# Patient Record
Sex: FEMALE | Race: WHITE | NOT HISPANIC OR LATINO | URBAN - METROPOLITAN AREA
[De-identification: names, ages, dates, MRNs, and addresses within clinical notes are randomized per-mention and may not be internally consistent; named-entity substitution may affect disease eponyms.]

---

## 2017-02-10 ENCOUNTER — IMPORTED ENCOUNTER (OUTPATIENT)
Dept: URBAN - METROPOLITAN AREA CLINIC 9 | Facility: CLINIC | Age: 57
End: 2017-02-10

## 2017-05-19 ENCOUNTER — IMPORTED ENCOUNTER (OUTPATIENT)
Dept: URBAN - METROPOLITAN AREA CLINIC 9 | Facility: CLINIC | Age: 57
End: 2017-05-19

## 2017-09-21 ENCOUNTER — IMPORTED ENCOUNTER (OUTPATIENT)
Dept: URBAN - METROPOLITAN AREA CLINIC 9 | Facility: CLINIC | Age: 57
End: 2017-09-21

## 2017-11-06 ENCOUNTER — IMPORTED ENCOUNTER (OUTPATIENT)
Dept: URBAN - METROPOLITAN AREA CLINIC 9 | Facility: CLINIC | Age: 57
End: 2017-11-06

## 2018-01-18 ENCOUNTER — IMPORTED ENCOUNTER (OUTPATIENT)
Dept: URBAN - METROPOLITAN AREA CLINIC 9 | Facility: CLINIC | Age: 58
End: 2018-01-18

## 2018-05-07 ENCOUNTER — IMPORTED ENCOUNTER (OUTPATIENT)
Dept: URBAN - METROPOLITAN AREA CLINIC 9 | Facility: CLINIC | Age: 58
End: 2018-05-07

## 2018-05-21 ENCOUNTER — IMPORTED ENCOUNTER (OUTPATIENT)
Dept: URBAN - METROPOLITAN AREA CLINIC 9 | Facility: CLINIC | Age: 58
End: 2018-05-21

## 2018-10-01 ENCOUNTER — IMPORTED ENCOUNTER (OUTPATIENT)
Dept: URBAN - METROPOLITAN AREA CLINIC 9 | Facility: CLINIC | Age: 58
End: 2018-10-01

## 2019-02-01 ENCOUNTER — IMPORTED ENCOUNTER (OUTPATIENT)
Dept: URBAN - METROPOLITAN AREA CLINIC 9 | Facility: CLINIC | Age: 59
End: 2019-02-01

## 2019-05-09 ENCOUNTER — IMPORTED ENCOUNTER (OUTPATIENT)
Dept: URBAN - METROPOLITAN AREA CLINIC 9 | Facility: CLINIC | Age: 59
End: 2019-05-09

## 2019-07-08 ENCOUNTER — IMPORTED ENCOUNTER (OUTPATIENT)
Dept: URBAN - METROPOLITAN AREA CLINIC 9 | Facility: CLINIC | Age: 59
End: 2019-07-08

## 2019-07-19 ENCOUNTER — IMPORTED ENCOUNTER (OUTPATIENT)
Dept: URBAN - METROPOLITAN AREA CLINIC 9 | Facility: CLINIC | Age: 59
End: 2019-07-19

## 2019-09-19 ENCOUNTER — IMPORTED ENCOUNTER (OUTPATIENT)
Dept: URBAN - METROPOLITAN AREA CLINIC 9 | Facility: CLINIC | Age: 59
End: 2019-09-19

## 2019-09-23 ENCOUNTER — IMPORTED ENCOUNTER (OUTPATIENT)
Dept: URBAN - METROPOLITAN AREA CLINIC 9 | Facility: CLINIC | Age: 59
End: 2019-09-23

## 2019-10-02 ENCOUNTER — IMPORTED ENCOUNTER (OUTPATIENT)
Dept: URBAN - METROPOLITAN AREA CLINIC 9 | Facility: CLINIC | Age: 59
End: 2019-10-02

## 2019-10-03 ENCOUNTER — IMPORTED ENCOUNTER (OUTPATIENT)
Dept: URBAN - METROPOLITAN AREA CLINIC 9 | Facility: CLINIC | Age: 59
End: 2019-10-03

## 2019-11-01 ENCOUNTER — IMPORTED ENCOUNTER (OUTPATIENT)
Dept: URBAN - METROPOLITAN AREA CLINIC 9 | Facility: CLINIC | Age: 59
End: 2019-11-01

## 2019-11-22 ENCOUNTER — IMPORTED ENCOUNTER (OUTPATIENT)
Dept: URBAN - METROPOLITAN AREA CLINIC 9 | Facility: CLINIC | Age: 59
End: 2019-11-22

## 2020-02-21 ENCOUNTER — IMPORTED ENCOUNTER (OUTPATIENT)
Dept: URBAN - METROPOLITAN AREA CLINIC 9 | Facility: CLINIC | Age: 60
End: 2020-02-21

## 2020-06-29 ENCOUNTER — IMPORTED ENCOUNTER (OUTPATIENT)
Dept: URBAN - METROPOLITAN AREA CLINIC 9 | Facility: CLINIC | Age: 60
End: 2020-06-29

## 2020-07-15 ENCOUNTER — IMPORTED ENCOUNTER (OUTPATIENT)
Dept: URBAN - METROPOLITAN AREA CLINIC 9 | Facility: CLINIC | Age: 60
End: 2020-07-15

## 2020-08-03 ENCOUNTER — IMPORTED ENCOUNTER (OUTPATIENT)
Dept: URBAN - METROPOLITAN AREA CLINIC 9 | Facility: CLINIC | Age: 60
End: 2020-08-03

## 2020-10-21 ENCOUNTER — IMPORTED ENCOUNTER (OUTPATIENT)
Dept: URBAN - METROPOLITAN AREA CLINIC 9 | Facility: CLINIC | Age: 60
End: 2020-10-21

## 2021-02-11 ENCOUNTER — IMPORTED ENCOUNTER (OUTPATIENT)
Dept: URBAN - METROPOLITAN AREA CLINIC 9 | Facility: CLINIC | Age: 61
End: 2021-02-11

## 2021-05-21 ENCOUNTER — IMPORTED ENCOUNTER (OUTPATIENT)
Dept: URBAN - METROPOLITAN AREA CLINIC 9 | Facility: CLINIC | Age: 61
End: 2021-05-21

## 2021-07-26 ENCOUNTER — IMPORTED ENCOUNTER (OUTPATIENT)
Dept: URBAN - METROPOLITAN AREA CLINIC 9 | Facility: CLINIC | Age: 61
End: 2021-07-26

## 2021-09-03 ENCOUNTER — IMPORTED ENCOUNTER (OUTPATIENT)
Dept: URBAN - METROPOLITAN AREA CLINIC 9 | Facility: CLINIC | Age: 61
End: 2021-09-03

## 2021-09-08 ENCOUNTER — IMPORTED ENCOUNTER (OUTPATIENT)
Dept: URBAN - METROPOLITAN AREA CLINIC 9 | Facility: CLINIC | Age: 61
End: 2021-09-08

## 2021-09-08 PROBLEM — H25.13: Noted: 2021-09-08

## 2021-09-08 PROBLEM — H40.1132: Noted: 2021-09-08

## 2021-10-19 ASSESSMENT — TONOMETRY
OD_IOP_MMHG: 9
OD_IOP_MMHG: 10
OD_IOP_MMHG: 15
OS_IOP_MMHG: 8
OD_IOP_MMHG: 8
OD_IOP_MMHG: 11
OD_IOP_MMHG: 12
OS_IOP_MMHG: 12
OD_IOP_MMHG: 9
OD_IOP_MMHG: 12
OD_IOP_MMHG: 11
OS_IOP_MMHG: 10
OD_IOP_MMHG: 10
OS_IOP_MMHG: 13
OD_IOP_MMHG: 12
OD_IOP_MMHG: 9
OD_IOP_MMHG: 11
OS_IOP_MMHG: 9
OD_IOP_MMHG: 10
OS_IOP_MMHG: 14
OS_IOP_MMHG: 10
OS_IOP_MMHG: 9
OS_IOP_MMHG: 11
OD_IOP_MMHG: 14
OD_IOP_MMHG: 11
OD_IOP_MMHG: 9
OS_IOP_MMHG: 12
OS_IOP_MMHG: 13
OS_IOP_MMHG: 11
OS_IOP_MMHG: 11
OS_IOP_MMHG: 9
OD_IOP_MMHG: 13
OD_IOP_MMHG: 12
OS_IOP_MMHG: 12
OS_IOP_MMHG: 7
OS_IOP_MMHG: 8
OS_IOP_MMHG: 11

## 2021-10-19 ASSESSMENT — VISUAL ACUITY
OS_CC: 20/25 SN
OS_CC: 20/25 SN
OD_CC: 20/25 -2 SN
OD_CC: 20/25 -2 SN
OD_CC: 20/25 SN
OD_CC: 20/30 + SN
OD_CC: 20/30 SN
OD_CC: 20/30 SN
OD_CC: 20/25 -2 SN
OS_CC: 20/25 SN
OD_CC: 20/25 SN
OS_CC: 20/40 SN
OD_CC: 20/30 + SN
OS_CC: 20/25 -2 SN
OS_CC: 20/25 SN
OS_CC: 20/20 - SN
OD_CC: 20/25 SN
OD_CC: 20/30 + SN
OD_CC: 20/30 - SN
OD_CC: 20/25 SN
OS_CC: 20/25 -2 SN
OD_CC: 20/20 -2 SN
OD_CC: 20/25 SN
OD_CC: 20/25 SN
OD_CC: 20/30 + SN
OS_CC: 20/20 SN
OS_CC: 20/25 +2 SN
OS_CC: 20/25 SN
OS_CC: 20/25 +2 SN
OS_CC: 20/20 +2 SN
OS_CC: 20/25 SN
OS_CC: 20/20 - SN
OS_CC: 20/25 - SN
OD_CC: 20/25 SN
OD_CC: 20/30 SN
OS_CC: 20/25 + SN
OS_CC: 20/25 SN
OS_CC: 20/25 + SN
OS_CC: 20/20 -2 SN

## 2021-12-07 ENCOUNTER — ESTABLISHED PATIENT (OUTPATIENT)
Dept: URBAN - METROPOLITAN AREA CLINIC 4 | Facility: CLINIC | Age: 61
End: 2021-12-07

## 2021-12-07 DIAGNOSIS — H40.1132: ICD-10-CM

## 2021-12-07 PROCEDURE — 99213 OFFICE O/P EST LOW 20 MIN: CPT

## 2021-12-07 ASSESSMENT — VISUAL ACUITY
OS_CC: 20/25
OD_CC: 20/20

## 2021-12-07 ASSESSMENT — TONOMETRY
OD_IOP_MMHG: 11
OS_IOP_MMHG: 12

## 2022-03-10 ENCOUNTER — ESTABLISHED PATIENT (OUTPATIENT)
Dept: URBAN - METROPOLITAN AREA CLINIC 4 | Facility: CLINIC | Age: 62
End: 2022-03-10

## 2022-03-10 DIAGNOSIS — H40.1132: ICD-10-CM

## 2022-03-10 PROCEDURE — 99213 OFFICE O/P EST LOW 20 MIN: CPT

## 2022-03-10 ASSESSMENT — VISUAL ACUITY
OD_CC: 20/30
OS_CC: 20/25+1

## 2022-03-10 ASSESSMENT — TONOMETRY
OD_IOP_MMHG: 15
OS_IOP_MMHG: 17

## 2022-06-26 RX ORDER — BETAMETHASONE DIPROPIONATE 0.5 MG/G
CREAM TOPICAL
COMMUNITY
Start: 2021-08-16

## 2022-06-26 RX ORDER — LISINOPRIL 10 MG/1
TABLET ORAL
COMMUNITY
Start: 2021-08-16

## 2022-07-06 ENCOUNTER — ESTABLISHED PATIENT (OUTPATIENT)
Dept: URBAN - METROPOLITAN AREA CLINIC 4 | Facility: CLINIC | Age: 62
End: 2022-07-06

## 2022-07-06 DIAGNOSIS — H40.1132: ICD-10-CM

## 2022-07-06 DIAGNOSIS — E11.9: ICD-10-CM

## 2022-07-06 DIAGNOSIS — Z79.899: ICD-10-CM

## 2022-07-06 DIAGNOSIS — H25.13: ICD-10-CM

## 2022-07-06 PROCEDURE — 99214 OFFICE O/P EST MOD 30 MIN: CPT

## 2022-07-06 PROCEDURE — 92250 FUNDUS PHOTOGRAPHY W/I&R: CPT

## 2022-07-06 ASSESSMENT — VISUAL ACUITY
OS_CC: 20/30
OD_CC: 20/30

## 2022-07-06 ASSESSMENT — TONOMETRY
OD_IOP_MMHG: 12
OS_IOP_MMHG: 15

## 2022-09-12 ENCOUNTER — TECH ONLY (OUTPATIENT)
Dept: URBAN - METROPOLITAN AREA CLINIC 4 | Facility: CLINIC | Age: 62
End: 2022-09-12

## 2022-09-12 DIAGNOSIS — Z79.899: ICD-10-CM

## 2022-09-12 PROCEDURE — 92083 EXTENDED VISUAL FIELD XM: CPT

## 2022-09-12 PROCEDURE — 92134 CPTRZ OPH DX IMG PST SGM RTA: CPT

## 2022-10-24 ENCOUNTER — TECH ONLY (OUTPATIENT)
Dept: URBAN - METROPOLITAN AREA CLINIC 4 | Facility: CLINIC | Age: 62
End: 2022-10-24

## 2022-10-24 DIAGNOSIS — H40.1132: ICD-10-CM

## 2022-10-24 PROCEDURE — 92083 EXTENDED VISUAL FIELD XM: CPT

## 2022-10-24 PROCEDURE — 92133 CPTRZD OPH DX IMG PST SGM ON: CPT

## 2022-12-02 ENCOUNTER — COMPREHENSIVE EXAM (OUTPATIENT)
Dept: URBAN - METROPOLITAN AREA CLINIC 4 | Facility: CLINIC | Age: 62
End: 2022-12-02

## 2022-12-02 PROCEDURE — 92250 FUNDUS PHOTOGRAPHY W/I&R: CPT

## 2022-12-02 PROCEDURE — 99214 OFFICE O/P EST MOD 30 MIN: CPT

## 2022-12-02 ASSESSMENT — TONOMETRY
OD_IOP_MMHG: 8
OS_IOP_MMHG: 8

## 2022-12-02 ASSESSMENT — VISUAL ACUITY
OS_CC: 20/20-1
OD_CC: 20/25

## 2022-12-02 ASSESSMENT — KERATOMETRY
OD_AXISANGLE2_DEGREES: 84
OD_K2POWER_DIOPTERS: 45.25
OS_K2POWER_DIOPTERS: 45.00
OS_AXISANGLE2_DEGREES: 87
OD_K1POWER_DIOPTERS: 44.00
OS_K1POWER_DIOPTERS: 44.00
OD_AXISANGLE_DEGREES: 174
OS_AXISANGLE_DEGREES: 177

## 2022-12-05 NOTE — PATIENT DISCUSSION
No retinal holes or tears seen on exam. Recommended OBSERVATION. We reviewed the signs and symptoms of retinal tear/retinal detachment and the importance of prompt evaluation should there be increasing floaters, new flashing lights, or decreasing peripheral vision in either eye at any time. Patient understands condition, prognosis and need for follow up care. Spoke with patient and she said symptoms started Monday and was at  yesterday and tested positive for COVID and was instructed by  to call PCP for ? Paxlovid Rx. Patient reported feeling better today and fever broke last night. Advised since tomorrow was day 5 no Paxlovid and would need to continue to rest and push fluids and treat symptoms with Tylenol/Advil as needed.   Patient verbalized understanding/TD

## 2023-07-27 ENCOUNTER — ESTABLISHED PATIENT (OUTPATIENT)
Dept: URBAN - METROPOLITAN AREA CLINIC 4 | Facility: CLINIC | Age: 63
End: 2023-07-27

## 2023-07-27 DIAGNOSIS — H40.1132: ICD-10-CM

## 2023-07-27 PROCEDURE — 99213 OFFICE O/P EST LOW 20 MIN: CPT

## 2023-07-27 ASSESSMENT — TONOMETRY
OD_IOP_MMHG: 14
OS_IOP_MMHG: 12

## 2023-07-27 ASSESSMENT — VISUAL ACUITY
OS_CC: 20/25
OD_CC: 20/25

## 2023-11-07 ENCOUNTER — DIAGNOSTICS ONLY (OUTPATIENT)
Facility: LOCATION | Age: 63
End: 2023-11-07

## 2023-11-07 DIAGNOSIS — H40.1132: ICD-10-CM

## 2023-11-07 PROCEDURE — 92133 CPTRZD OPH DX IMG PST SGM ON: CPT

## 2023-11-07 PROCEDURE — 92083 EXTENDED VISUAL FIELD XM: CPT

## 2023-12-01 ENCOUNTER — ESTABLISHED PATIENT (OUTPATIENT)
Facility: LOCATION | Age: 63
End: 2023-12-01

## 2023-12-01 DIAGNOSIS — H40.1132: ICD-10-CM

## 2023-12-01 PROCEDURE — 92012 INTRM OPH EXAM EST PATIENT: CPT

## 2023-12-01 PROCEDURE — 92020 GONIOSCOPY: CPT

## 2023-12-01 ASSESSMENT — VISUAL ACUITY
OS_SC: 20/25
OD_SC: 20/25

## 2023-12-01 ASSESSMENT — TONOMETRY
OD_IOP_MMHG: 17
OS_IOP_MMHG: 14

## 2024-04-03 ENCOUNTER — ESTABLISHED PATIENT (OUTPATIENT)
Facility: LOCATION | Age: 64
End: 2024-04-03

## 2024-04-03 DIAGNOSIS — Z79.899: ICD-10-CM

## 2024-04-03 PROCEDURE — 92083 EXTENDED VISUAL FIELD XM: CPT

## 2024-04-03 PROCEDURE — 92134 CPTRZ OPH DX IMG PST SGM RTA: CPT

## 2024-10-11 ENCOUNTER — DIAGNOSTICS ONLY (OUTPATIENT)
Facility: LOCATION | Age: 64
End: 2024-10-11

## 2024-10-11 DIAGNOSIS — Z79.899: ICD-10-CM

## 2024-10-11 PROCEDURE — 92083 EXTENDED VISUAL FIELD XM: CPT

## 2024-11-07 ENCOUNTER — COMPREHENSIVE EXAM (OUTPATIENT)
Facility: LOCATION | Age: 64
End: 2024-11-07

## 2024-11-07 DIAGNOSIS — H01.024: ICD-10-CM

## 2024-11-07 DIAGNOSIS — H04.123: ICD-10-CM

## 2024-11-07 DIAGNOSIS — Z79.899: ICD-10-CM

## 2024-11-07 DIAGNOSIS — E11.9: ICD-10-CM

## 2024-11-07 DIAGNOSIS — H01.021: ICD-10-CM

## 2024-11-07 DIAGNOSIS — H40.1132: ICD-10-CM

## 2024-11-07 DIAGNOSIS — H43.813: ICD-10-CM

## 2024-11-07 DIAGNOSIS — H25.13: ICD-10-CM

## 2024-11-07 PROCEDURE — 92133 CPTRZD OPH DX IMG PST SGM ON: CPT

## 2024-11-07 PROCEDURE — 92014 COMPRE OPH EXAM EST PT 1/>: CPT

## 2025-03-07 ENCOUNTER — CONSULTATION/EVALUATION (OUTPATIENT)
Age: 65
End: 2025-03-07

## 2025-03-07 DIAGNOSIS — H40.1132: ICD-10-CM

## 2025-03-07 PROCEDURE — 92133 CPTRZD OPH DX IMG PST SGM ON: CPT

## 2025-03-07 PROCEDURE — 92014 COMPRE OPH EXAM EST PT 1/>: CPT

## 2025-03-25 ENCOUNTER — DIAGNOSTICS ONLY (OUTPATIENT)
Age: 65
End: 2025-03-25

## 2025-03-25 DIAGNOSIS — Z79.899: ICD-10-CM

## 2025-03-25 PROCEDURE — 92083 EXTENDED VISUAL FIELD XM: CPT

## 2025-04-22 ENCOUNTER — DIAGNOSTICS ONLY (OUTPATIENT)
Age: 65
End: 2025-04-22

## 2025-04-22 DIAGNOSIS — H40.1132: ICD-10-CM

## 2025-04-22 PROCEDURE — 92083 EXTENDED VISUAL FIELD XM: CPT

## 2025-04-25 ENCOUNTER — CLINIC PROCEDURE ONLY (OUTPATIENT)
Age: 65
End: 2025-04-25

## 2025-04-25 DIAGNOSIS — H40.1132: ICD-10-CM

## 2025-04-25 PROCEDURE — 65855 TRABECULOPLASTY LASER SURG: CPT

## 2025-07-18 ENCOUNTER — CLINIC PROCEDURE ONLY (OUTPATIENT)
Age: 65
End: 2025-07-18

## 2025-07-18 DIAGNOSIS — H40.1132: ICD-10-CM

## 2025-07-18 PROCEDURE — 65855 TRABECULOPLASTY LASER SURG: CPT

## 2025-08-11 ENCOUNTER — FOLLOW UP (OUTPATIENT)
Age: 65
End: 2025-08-11

## 2025-08-11 DIAGNOSIS — H40.1132: ICD-10-CM

## 2025-08-11 PROCEDURE — 92133 CPTRZD OPH DX IMG PST SGM ON: CPT

## 2025-08-11 PROCEDURE — 99214 OFFICE O/P EST MOD 30 MIN: CPT
